# Patient Record
Sex: FEMALE | Race: ASIAN
[De-identification: names, ages, dates, MRNs, and addresses within clinical notes are randomized per-mention and may not be internally consistent; named-entity substitution may affect disease eponyms.]

---

## 2019-06-21 ENCOUNTER — HOSPITAL ENCOUNTER (OUTPATIENT)
Dept: HOSPITAL 10 - SDS | Age: 69
Discharge: HOME | End: 2019-06-21
Attending: SURGERY
Payer: MEDICARE

## 2019-06-21 ENCOUNTER — HOSPITAL ENCOUNTER (OUTPATIENT)
Dept: HOSPITAL 91 - SDS | Age: 69
Discharge: HOME | End: 2019-06-21
Payer: MEDICARE

## 2019-06-21 VITALS — RESPIRATION RATE: 12 BRPM | DIASTOLIC BLOOD PRESSURE: 61 MMHG | SYSTOLIC BLOOD PRESSURE: 117 MMHG | HEART RATE: 72 BPM

## 2019-06-21 VITALS — RESPIRATION RATE: 13 BRPM | DIASTOLIC BLOOD PRESSURE: 55 MMHG | HEART RATE: 60 BPM | SYSTOLIC BLOOD PRESSURE: 114 MMHG

## 2019-06-21 VITALS — SYSTOLIC BLOOD PRESSURE: 131 MMHG | RESPIRATION RATE: 18 BRPM | DIASTOLIC BLOOD PRESSURE: 69 MMHG

## 2019-06-21 VITALS — DIASTOLIC BLOOD PRESSURE: 60 MMHG | HEART RATE: 82 BPM | RESPIRATION RATE: 16 BRPM | SYSTOLIC BLOOD PRESSURE: 102 MMHG

## 2019-06-21 VITALS — SYSTOLIC BLOOD PRESSURE: 176 MMHG | HEART RATE: 93 BPM | DIASTOLIC BLOOD PRESSURE: 84 MMHG | RESPIRATION RATE: 16 BRPM

## 2019-06-21 VITALS — RESPIRATION RATE: 13 BRPM | HEART RATE: 74 BPM | DIASTOLIC BLOOD PRESSURE: 60 MMHG | SYSTOLIC BLOOD PRESSURE: 125 MMHG

## 2019-06-21 VITALS — SYSTOLIC BLOOD PRESSURE: 102 MMHG | DIASTOLIC BLOOD PRESSURE: 44 MMHG | HEART RATE: 66 BPM | RESPIRATION RATE: 14 BRPM

## 2019-06-21 VITALS
WEIGHT: 161.16 LBS | BODY MASS INDEX: 32.49 KG/M2 | BODY MASS INDEX: 32.49 KG/M2 | HEIGHT: 59 IN | HEIGHT: 59 IN | WEIGHT: 161.16 LBS

## 2019-06-21 VITALS — RESPIRATION RATE: 16 BRPM | HEART RATE: 68 BPM | SYSTOLIC BLOOD PRESSURE: 126 MMHG | DIASTOLIC BLOOD PRESSURE: 58 MMHG

## 2019-06-21 VITALS — DIASTOLIC BLOOD PRESSURE: 65 MMHG | HEART RATE: 68 BPM | RESPIRATION RATE: 12 BRPM | SYSTOLIC BLOOD PRESSURE: 109 MMHG

## 2019-06-21 DIAGNOSIS — L72.0: Primary | ICD-10-CM

## 2019-06-21 DIAGNOSIS — E78.5: ICD-10-CM

## 2019-06-21 DIAGNOSIS — I10: ICD-10-CM

## 2019-06-21 PROCEDURE — 14020 TIS TRNFR S/A/L 10 SQ CM/<: CPT

## 2019-06-21 PROCEDURE — 88307 TISSUE EXAM BY PATHOLOGIST: CPT

## 2019-06-21 RX ADMIN — THIAMINE HYDROCHLORIDE 1 MLS/HR: 100 INJECTION, SOLUTION INTRAMUSCULAR; INTRAVENOUS at 12:27

## 2019-06-21 RX ADMIN — BUPIVACAINE HYDROCHLORIDE 1 ML: 2.5 INJECTION, SOLUTION EPIDURAL; INFILTRATION; INTRACAUDAL; PERINEURAL at 13:03

## 2019-06-21 RX ADMIN — CEFAZOLIN SODIUM 1 MLS/HR: 2 SOLUTION INTRAVENOUS at 12:56

## 2019-06-21 NOTE — PAC
Date/Time of Note


Date/Time of Note


DATE: 6/21/19 


TIME: 13:40





Post-Anesthesia Notes


Post-Anesthesia Note


Last documented vital signs





Vital Signs


  Date      Temp  Pulse  Resp  B/P (MAP)   Pulse Ox  O2          O2 Flow    FiO2


Time                                                 Delivery    Rate


   6/21/19  98.0     93    16      176/84        98  Room Air


     12:30                          (114)





Activity:  WNL


Respiratory function:  WNL


Cardiovascular function:  WNL


Mental status:  Baseline


Pain reasonably controlled:  Yes


Hydration appropriate:  Yes


Nausea/Vomiting absent:  Yes


Comments


BP:112/56, P:78, Spo2:100%, T:98,8











NATTY CHAPMAN MD              Jun 21, 2019 13:41

## 2019-06-21 NOTE — PREAC
Date/Time of Note


Date/Time of Note


DATE: 6/21/19 


TIME: 12:50





Anesthesia Eval and Record


Evaluation


Time Pre-Procedure Interview


DATE: 6/21/19 


TIME: 12:50


Age


69


Sex


female


NPO:  8 hrs


Preoperative diagnosis


Rt axillary mass


Planned procedure


Excision of rt axillary mass





Past Medical History


Past Medical History:  Includes


Cardio:  HTN, Dyslipidemia


GI:  Morbid obesity





Surgery & Anesthesia Issues


No known issue





Meds


Anticoagulation:  No


Beta Blocker within 24 hr:  No


Reason Beta Blocker not given:  Pt. not on B-Blocker


Reported Medications


Losartan-Hydrochlorothiazide (Losartan-HCTZ) 50-12.5 Mg Tab, 1 TAB ORAL QAM


   6/21/19





Current Medications


Sodium Chloride 1,000 ml @  75 mls/hr [id number] IV  Last administered on 6/21/19at


12:27; Admin Dose 75 MLS/HR;  Start 6/21/19 at 08:00;  Stop 6/21/19 at 21:19


Meds reviewed:  Yes





Allergies


Coded Allergies:  


     No Known Drug Allergies (Unverified  Allergy, Unknown, 6/20/19)


Allergies Reviewed:  Yes





Labs/Studies


Labs Reviewed:  Reviewed by anesthesiologist


Pregnancy test:  N/A


Studies:  ECG





Pre-procedure Exam


Last vitals





Vital Signs


  Date      Temp  Pulse  Resp  B/P (MAP)   Pulse Ox  O2          O2 Flow    FiO2


Time                                                 Delivery    Rate


   6/21/19  98.0     93    16      176/84        98  Room Air


     12:30                          (114)





Airway:  Adequate mouth opening, Adequate thyromental dist


Mallampati:  Mallampati II


Teeth:  Normal


Lung:  Normal


Heart:  Normal





ASA Physical Status


ASA physical status:  3


Emergency:  None





Planned Anesthetic


General/MAC:  LMA





Planned Pain Management


Parenteral pain med





Pre-operative Attestations


Prior to commencing anesthesia and surgery, the patient was re-evaluated, there 


was verification of:


*The patient's identity


*The results of appropriate recent lab work and preoperative vital signs


*The above evaluation not changing prior to induction


*Anesthetic plan, risk benefits, alternative and complications discussed with 


patient/family; questions answered; patient/family understands, accepts and 


wishes to proceed.











NATTY CHAPMAN MD              Jun 21, 2019 12:51

## 2019-06-21 NOTE — OPR
Date/Time of Note


Date/Time of Note


DATE: 6/21/19 


TIME: 13:29





Operative Report


Procedure Date:  Jun 21, 2019


Preoperative Diagnosis


right axillary mass


Postoperative Diagnosis


same


Operation/Procedure Performed


1. excision of right axillary mass 5 cm mass 5 cm incision


2. localized adjacent tissue transfer with the use of skin flaps 10 sq cm defect


3. therapeutic injection of subcutaneous local anesthesia


Surgeon


see signature line


Assistant


none


Anesthesia Type:  general


Estimated Blood Loss:  0 - 10 ml's


Transfusion


   none


Specimen


right axillary mass


Grafts/Implants


none


Complications


none


Pt Condition Post Procedure:  stable


Indications


This is a 6-year-old female with a right axillary mass.  She requests surgical 


excision of the mass.  Risks alternatives benefits and personal were discussed 


the patient.  Patient expressed understanding and consents to the operation.


Procedure Description


Patient is taken to the OR and prepped and draped in usual sterile fashion.  


Surgical time was performed.  IV antibiotics given.  Elliptical incision was 


made with a 15 blade over the right axillary mass.  Dissection with cautery skin


of the mass and the mass was circumferentially excised.  Good hemostasis 


established.  Due to tissue defect localized adjacent to his transfer with these


of skin flaps was performed.  Multilayer closed with interrupted 3-0 Vicryl and 


skin staples.  Therapeutic subcutaneous local anesthesia was injected at the 


incision site.  Dry dressings were applied.











HILARIO MEDEL                  Jun 21, 2019 13:31